# Patient Record
Sex: FEMALE | Race: WHITE | Employment: FULL TIME | URBAN - METROPOLITAN AREA
[De-identification: names, ages, dates, MRNs, and addresses within clinical notes are randomized per-mention and may not be internally consistent; named-entity substitution may affect disease eponyms.]

---

## 2022-03-14 ENCOUNTER — LAB ENCOUNTER (OUTPATIENT)
Dept: LAB | Age: 27
End: 2022-03-14
Attending: NURSE PRACTITIONER
Payer: COMMERCIAL

## 2022-03-14 DIAGNOSIS — N92.6 IRREGULAR MENSES: ICD-10-CM

## 2022-03-14 LAB
B-HCG SERPL-ACNC: ABNORMAL MIU/ML
HCT VFR BLD AUTO: 36.4 %
HGB BLD-MCNC: 12.3 G/DL
RH BLOOD TYPE: POSITIVE

## 2022-03-14 PROCEDURE — 85018 HEMOGLOBIN: CPT

## 2022-03-14 PROCEDURE — 84702 CHORIONIC GONADOTROPIN TEST: CPT

## 2022-03-14 PROCEDURE — 86901 BLOOD TYPING SEROLOGIC RH(D): CPT

## 2022-03-14 PROCEDURE — 36415 COLL VENOUS BLD VENIPUNCTURE: CPT

## 2022-03-14 PROCEDURE — 84144 ASSAY OF PROGESTERONE: CPT

## 2022-03-14 PROCEDURE — 85014 HEMATOCRIT: CPT

## 2022-03-14 PROCEDURE — 86900 BLOOD TYPING SEROLOGIC ABO: CPT

## 2022-03-15 PROBLEM — O46.8X1 SUBCHORIONIC HEMATOMA IN FIRST TRIMESTER: Status: ACTIVE | Noted: 2022-03-15

## 2022-03-15 PROBLEM — O41.8X10 SUBCHORIONIC HEMATOMA IN FIRST TRIMESTER: Status: ACTIVE | Noted: 2022-03-15

## 2022-04-05 PROCEDURE — 87086 URINE CULTURE/COLONY COUNT: CPT | Performed by: OBSTETRICS & GYNECOLOGY

## 2022-04-05 PROCEDURE — 87591 N.GONORRHOEAE DNA AMP PROB: CPT | Performed by: OBSTETRICS & GYNECOLOGY

## 2022-04-05 PROCEDURE — 88175 CYTOPATH C/V AUTO FLUID REDO: CPT | Performed by: OBSTETRICS & GYNECOLOGY

## 2022-04-05 PROCEDURE — 87491 CHLMYD TRACH DNA AMP PROBE: CPT | Performed by: OBSTETRICS & GYNECOLOGY

## 2022-05-10 PROBLEM — O98.512 COVID-19 AFFECTING PREGNANCY IN SECOND TRIMESTER: Status: ACTIVE | Noted: 2022-05-10

## 2022-05-10 PROBLEM — U07.1 COVID-19 AFFECTING PREGNANCY IN SECOND TRIMESTER: Status: ACTIVE | Noted: 2022-05-10

## 2022-08-01 ENCOUNTER — LAB ENCOUNTER (OUTPATIENT)
Dept: LAB | Age: 27
End: 2022-08-01
Attending: OBSTETRICS & GYNECOLOGY
Payer: COMMERCIAL

## 2022-08-01 DIAGNOSIS — Z34.02 ENCOUNTER FOR SUPERVISION OF NORMAL FIRST PREGNANCY IN SECOND TRIMESTER: ICD-10-CM

## 2022-08-01 DIAGNOSIS — Z34.03 ENCOUNTER FOR SUPERVISION OF NORMAL FIRST PREGNANCY IN THIRD TRIMESTER: ICD-10-CM

## 2022-08-01 LAB
BASOPHILS # BLD AUTO: 0.03 X10(3) UL (ref 0–0.2)
BASOPHILS NFR BLD AUTO: 0.4 %
DEPRECATED HBV CORE AB SER IA-ACNC: 11.4 NG/ML
EOSINOPHIL # BLD AUTO: 0.23 X10(3) UL (ref 0–0.7)
EOSINOPHIL NFR BLD AUTO: 2.8 %
ERYTHROCYTE [DISTWIDTH] IN BLOOD BY AUTOMATED COUNT: 12.9 %
GLUCOSE 1H P GLC SERPL-MCNC: 112 MG/DL
HCT VFR BLD AUTO: 31.6 %
HGB BLD-MCNC: 10.5 G/DL
IMM GRANULOCYTES # BLD AUTO: 0.06 X10(3) UL (ref 0–1)
IMM GRANULOCYTES NFR BLD: 0.7 %
LYMPHOCYTES # BLD AUTO: 1.93 X10(3) UL (ref 1–4)
LYMPHOCYTES NFR BLD AUTO: 23.1 %
MCH RBC QN AUTO: 30.1 PG (ref 26–34)
MCHC RBC AUTO-ENTMCNC: 33.2 G/DL (ref 31–37)
MCV RBC AUTO: 90.5 FL
MONOCYTES # BLD AUTO: 0.57 X10(3) UL (ref 0.1–1)
MONOCYTES NFR BLD AUTO: 6.8 %
NEUTROPHILS # BLD AUTO: 5.52 X10 (3) UL (ref 1.5–7.7)
NEUTROPHILS # BLD AUTO: 5.52 X10(3) UL (ref 1.5–7.7)
NEUTROPHILS NFR BLD AUTO: 66.2 %
PLATELET # BLD AUTO: 223 10(3)UL (ref 150–450)
RBC # BLD AUTO: 3.49 X10(6)UL
WBC # BLD AUTO: 8.3 X10(3) UL (ref 4–11)

## 2022-08-01 PROCEDURE — 82950 GLUCOSE TEST: CPT

## 2022-08-01 PROCEDURE — 87389 HIV-1 AG W/HIV-1&-2 AB AG IA: CPT

## 2022-08-01 PROCEDURE — 36415 COLL VENOUS BLD VENIPUNCTURE: CPT

## 2022-08-01 PROCEDURE — 82728 ASSAY OF FERRITIN: CPT

## 2022-08-01 PROCEDURE — 85025 COMPLETE CBC W/AUTO DIFF WBC: CPT

## 2022-11-04 NOTE — PROGRESS NOTES
Pt is a 33year old female admitted to [facility]. Patient presents with:  Laboring     Pt is  40w5d intra-uterine pregnancy. History obtained, consents signed. Oriented to room, staff, and plan of care.

## 2022-11-04 NOTE — ANESTHESIA PROCEDURE NOTES
Labor Analgesia    Date/Time: 11/4/2022 6:47 AM  Performed by: Cedrick Crisostomo MD  Authorized by: Cedrick Crisostomo MD       General Information and Staff    Start Time:  11/4/2022 6:47 AM  End Time:  11/4/2022 6:57 AM  Anesthesiologist:  Cedrick Crisostomo MD  Performed by:   Anesthesiologist  Patient Location:  OB  Site Identification: surface landmarks    Reason for Block: labor epidural    Preanesthetic Checklist: patient identified, IV checked, risks and benefits discussed, monitors and equipment checked, pre-op evaluation, timeout performed, IV bolus, anesthesia consent and sterile technique used      Procedure Details    Patient Position:  Sitting  Prep: ChloraPrep    Monitoring:  Heart rate and continuous pulse ox  Approach:  Midline    Epidural Needle    Injection Technique:  MATTIE saline  Needle Type:  Tuohy  Needle Gauge:  17 G  Needle Length:  3.375 in  Needle Insertion Depth:  6  Location:  L3-4    Spinal Needle      Catheter    Catheter Type:  End hole  Catheter Size:  19 G  Catheter at Skin Depth:  11  Test Dose:  Negative    Assessment    Sensory Level:  T10    Additional Comments     Test Dose Given at 657 am  Fentanyl 2 mc/ml + Ropivicaine 0.15% epidural infusion 12cc/hr  Fentanyl 50 mcg/mL 100 mcg  Bupiv 0.5% 5ml

## 2022-11-04 NOTE — PLAN OF CARE
Problem: BIRTH - VAGINAL/ SECTION  Goal: Fetal and maternal status remain reassuring during the birth process  Description: INTERVENTIONS:  - Monitor vital signs  - Monitor fetal heart rate  - Monitor uterine activity  - Monitor labor progression (vaginal delivery)  - DVT prophylaxis (C/S delivery)  - Surgical antibiotic prophylaxis (C/S delivery)  2022 by Dee Marcus RN  Outcome: Progressing  2022 by Dee Marcus RN  Outcome: Progressing     Problem: PAIN - ADULT  Goal: Verbalizes/displays adequate comfort level or patient's stated pain goal  Description: INTERVENTIONS:  - Encourage pt to monitor pain and request assistance  - Assess pain using appropriate pain scale  - Administer analgesics based on type and severity of pain and evaluate response  - Implement non-pharmacological measures as appropriate and evaluate response  - Consider cultural and social influences on pain and pain management  - Manage/alleviate anxiety  - Utilize distraction and/or relaxation techniques  - Monitor for opioid side effects  - Notify MD/LIP if interventions unsuccessful or patient reports new pain  - Anticipate increased pain with activity and pre-medicate as appropriate  2022 by Dee Marcus RN  Outcome: Progressing  2022 by Dee Marcus RN  Outcome: Progressing     Problem: ANXIETY  Goal: Will report anxiety at manageable levels  Description: INTERVENTIONS:  - Administer medication as ordered  - Teach and rehearse alternative coping skills  - Provide emotional support with 1:1 interaction with staff  2022 by Dee Marcus RN  Outcome: Progressing  2022 by Dee Marcus RN  Outcome: Progressing     Problem: Patient/Family Goals  Goal: Patient/Family Long Term Goal  Description: Patient's Long Term Goal: uncomplicated vaginal delivery    Interventions:  - proceed with POC  - See additional Care Plan goals for specific interventions  11/4/2022 0615 by Prasanna Valdes RN  Outcome: Progressing  11/4/2022 0615 by Prasanna Valdes RN  Outcome: Progressing  Goal: Patient/Family Short Term Goal  Description: Patient's Short Term Goal: adequate pain management    Interventions:   -proceed with POC  - See additional Care Plan goals for specific interventions  11/4/2022 0615 by Prasanna Valdes RN  Outcome: Progressing  11/4/2022 0615 by Prasanna Valdes RN  Outcome: Progressing

## 2022-11-04 NOTE — PLAN OF CARE
Problem: BIRTH - VAGINAL/ SECTION  Goal: Fetal and maternal status remain reassuring during the birth process  Description: INTERVENTIONS:  - Monitor vital signs  - Monitor fetal heart rate  - Monitor uterine activity  - Monitor labor progression (vaginal delivery)  - DVT prophylaxis (C/S delivery)  - Surgical antibiotic prophylaxis (C/S delivery)  Outcome: Progressing     Problem: PAIN - ADULT  Goal: Verbalizes/displays adequate comfort level or patient's stated pain goal  Description: INTERVENTIONS:  - Encourage pt to monitor pain and request assistance  - Assess pain using appropriate pain scale  - Administer analgesics based on type and severity of pain and evaluate response  - Implement non-pharmacological measures as appropriate and evaluate response  - Consider cultural and social influences on pain and pain management  - Manage/alleviate anxiety  - Utilize distraction and/or relaxation techniques  - Monitor for opioid side effects  - Notify MD/LIP if interventions unsuccessful or patient reports new pain  - Anticipate increased pain with activity and pre-medicate as appropriate  Outcome: Progressing     Problem: ANXIETY  Goal: Will report anxiety at manageable levels  Description: INTERVENTIONS:  - Administer medication as ordered  - Teach and rehearse alternative coping skills  - Provide emotional support with 1:1 interaction with staff  Outcome: Progressing     Problem: Patient/Family Goals  Goal: Patient/Family Long Term Goal  Description: Patient's Long Term Goal: uncomplicated vaginal delivery    Interventions:  - proceed with POC  - See additional Care Plan goals for specific interventions  Outcome: Progressing  Goal: Patient/Family Short Term Goal  Description: Patient's Short Term Goal: adequate pain management    Interventions:   -proceed with POC  - See additional Care Plan goals for specific interventions  Outcome: Progressing     Problem: SAFETY ADULT - FALL  Goal: Free from fall injury  Description: INTERVENTIONS:  - Assess pt frequently for physical needs  - Identify cognitive and physical deficits and behaviors that affect risk of falls.   - [place] fall precautions as indicated by assessment.  - Educate pt/family on patient safety including physical limitations  - Instruct pt to call for assistance with activity based on assessment  - Modify environment to reduce risk of injury  - Provide assistive devices as appropriate  - Consider OT/PT consult to assist with strengthening/mobility  - Encourage toileting schedule  Outcome: Progressing

## 2022-11-05 NOTE — PROGRESS NOTES
Patient transferred to mother/baby room 1116 per cart in stable condition with baby and personal belongings. Accompanied by  and staff. Report given to mother/baby GAY Hollis.

## 2022-11-05 NOTE — PLAN OF CARE
Problem: SAFETY ADULT - FALL  Goal: Free from fall injury  Description: INTERVENTIONS:  - Assess pt frequently for physical needs  - Identify cognitive and physical deficits and behaviors that affect risk of falls.   - [place] fall precautions as indicated by assessment.  - Educate pt/family on patient safety including physical limitations  - Instruct pt to call for assistance with activity based on assessment  - Modify environment to reduce risk of injury  - Provide assistive devices as appropriate  - Consider OT/PT consult to assist with strengthening/mobility  - Encourage toileting schedule  Outcome: Progressing

## 2022-11-05 NOTE — PROGRESS NOTES
Report given to HERB TURNER [facility]. Pt in stable condition.

## 2022-11-05 NOTE — PROGRESS NOTES
Patient struggling with an intermittently working Epidural on pump  Very uncomfortable     Cx:  7 cm with the head at 0 and caput at +2, cervix is now edematous as well. FTP for over 4 hours with adequate contractions. Plan PLTC/S  Risk and complications reviewed with patient, SO and Parents.

## 2022-11-05 NOTE — ANESTHESIA POSTPROCEDURE EVALUATION
[address] [facility] [place] Patient Status:  Inpatient   Age/Gender 33year old female MRN [id number]   Location [facility] Attending Berry Morse MD   Hosp Day # 0 PCP No primary care provider on file. Anesthesia Post-op Note     SECTION    Procedure Summary     Date: 22 Room / Location: [facility] L+D OR [facility] L+D OR    Anesthesia Start:  Anesthesia Stop:     Procedure:  SECTION Diagnosis:     Surgeons: Jammie Contreras MD Anesthesiologist: Evelia Borden MD    Anesthesia Type: general, epidural ASA Status: 2          Anesthesia Type: general, epidural    Vitals Value Taken Time   /71 22   Temp 101.5 22   Pulse 105 22   Resp 19 22   SpO2 100 % 22   Vitals shown include unvalidated device data. Patient Location: PACU    Anesthesia Type: general    Airway Patency: patent and extubated    Postop Pain Control: satisfactory to patient    Mental Status: preanesthetic baseline    Nausea/Vomiting: none    Cardiopulmonary/Hydration status: stable euvolemic    Complications: no apparent anesthesia related complications    Postop vital signs: stable    Dental Exam: Unchanged from Preop    Patient to be discharged from PACU when criteria met.

## 2022-11-05 NOTE — ANESTHESIA PROCEDURE NOTES
Airway  Date/Time: 11/4/2022 7:59 PM  Urgency: elective    Airway not difficult    General Information and Staff    Patient location during procedure: OR  Anesthesiologist: Tarsha Yost MD  Performed: anesthesiologist     Indications and Patient Condition  Indications for airway management: anesthesia  Spontaneous Ventilation: absent  Sedation level: deep  Preoxygenated: yes  Patient position: sniffing  Mask difficulty assessment: 0 - not attempted  Planned trial extubation    Final Airway Details  Final airway type: endotracheal airway      Successful airway: ETT  Cuffed: yes   Successful intubation technique: Video laryngoscopy  Facilitating devices/methods: cricoid pressure (Unable to pass ETT due to cricoid pressure- easily passed once cricoid eased.)  Endotracheal tube insertion site: oral  Blade: Sakshi  Blade size: #3  ETT size (mm): 7.0    Cormack-Lehane Classification: grade I - full view of glottis  Placement verified by: chest auscultation and capnometry   Cuff volume (mL): 10  Measured from: lips  ETT to lips (cm): 21  Number of attempts at approach: 1    Additional Comments  Atraumatic laryngoscopy and endotracheal intubation- no lip/teeth injury noted

## 2022-11-05 NOTE — CM/SW NOTE
Order received for sw to see pt due to EPDS score of 8. Sw met with pt and her life partner Adi Flores. Pt had  yesterday, delivered healthy baby girl. This is her only child. Discussed EPDS screening. Pt states she has hx of anxiety/depression. She sees a therapist and psychiatrist.  Provided her  Mood and Anxiety Disorders flyer. She appreciated social work visit.     Army Hernandez, MARIO  /Discharge Planner

## 2022-11-06 NOTE — PLAN OF CARE
Problem: SAFETY ADULT - FALL  Goal: Free from fall injury  Description: INTERVENTIONS:  - Assess pt frequently for physical needs  - Identify cognitive and physical deficits and behaviors that affect risk of falls. - [place] fall precautions as indicated by assessment.  - Educate pt/family on patient safety including physical limitations  - Instruct pt to call for assistance with activity based on assessment  - Modify environment to reduce risk of injury  - Provide assistive devices as appropriate  - Consider OT/PT consult to assist with strengthening/mobility  - Encourage toileting schedule  Outcome: Completed     Problem: POSTPARTUM  Goal: Long Term Goal:Experiences normal postpartum course  Description: INTERVENTIONS:  - Assess and monitor vital signs and lab values. - Assess fundus and lochia. - Provide ice/sitz baths for perineum discomfort. - Monitor healing of incision/episiotomy/laceration, and assess for signs and symptoms of infection and hematoma. - Assess bladder function and monitor for bladder distention.  - Provide/instruct/assist with pericare as needed. - Provide VTE prophylaxis as needed. - Monitor bowel function.  - Encourage ambulation and provide assistance as needed. - Assess and monitor emotional status and provide social service/psych resources as needed. - Utilize standard precautions and use personal protective equipment as indicated. Ensure aseptic care of all intravenous lines and invasive tubes/drains.  - Obtain immunization and exposure to communicable diseases history. Outcome: Progressing  Goal: Optimize infant feeding at the breast  Description: INTERVENTIONS:  - Initiate breast feeding within first hour after birth. - Monitor effectiveness of current breast feeding efforts. - Assess support systems available to mother/family.  - Identify cultural beliefs/practices regarding lactation, letdown techniques, maternal food preferences.   - Assess mother's knowledge and previous experience with breast feeding.  - Provide information as needed about early infant feeding cues (e.g., rooting, lip smacking, sucking fingers/hand) versus late cue of crying.  - Discuss/demonstrate breast feeding aids (e.g., infant sling, nursing footstool/pillows, and breast pumps). - Encourage mother/other family members to express feelings/concerns, and actively listen. - Educate father/SO about benefits of breast feeding and how to manage common lactation challenges. - Recommend avoidance of specific medications or substances incompatible with breast feeding.  - Assess and monitor for signs of nipple pain/trauma. - Instruct and provide assistance with proper latch. - Review techniques for milk expression (breast pumping) and storage of breast milk. Provide pumping equipment/supplies, instructions and assistance, as needed. - Encourage rooming-in and breast feeding on demand.  - Encourage skin-to-skin contact. - Provide LC support as needed. - Assess for and manage engorgement. - Provide breast feeding education handouts and information on community breast feeding support. Outcome: Progressing  Goal: Establishment of adequate milk supply with medication/procedure interruptions  Description: INTERVENTIONS:  - Review techniques for milk expression (breast pumping). - Provide pumping equipment/supplies, instructions, and assistance until it is safe to breastfeed infant. Outcome: Progressing  Goal: Experiences normal breast weaning course  Description: INTERVENTIONS:  - Assess for and manage engorgement. - Instruct on breast care. - Provide comfort measures. Outcome: Progressing  Goal: Appropriate maternal -  bonding  Description: INTERVENTIONS:  - Assess caregiver- interactions. - Assess caregiver's emotional status and coping mechanisms. - Encourage caregiver to participate in  daily care.   - Assess support systems available to mother/family.  - Provide social services/case management support as needed.   Outcome: Progressing

## 2022-11-07 NOTE — PROGRESS NOTES
DISCHARGE NOTE  Discharge & Follow-Up information reviewed with parents, no questions following. ID Bands checked and verified at bedside. HUGS and Kisses tags removed  Baby in: car seat  Pt. in wheelchair.    Escorted off unit by: PCT

## 2022-11-07 NOTE — DISCHARGE SUMMARY
[facility]  Discharge Summary    Kendra Stephen Patient Status:  Inpatient    1995 MRN [id number]   [facility] 2SW-J Attending Nikko Ceballos MD   Hosp Day # 3 PCP No primary care provider on file.      Admit Date:  2022    EDC: Estimated Date of Delivery: 10/30/22    Gestational Age: 39w6d    Antepartum complications: See Prenatal Record    Date of Delivery: See Delivery Summary    Delivered By:  See Delivery Summary    Delivery Type: primary  section, low transverse incision       Intrapartum Complications: See Delivery Summary    Episiotomy / lacerations: See Delivery Summary      Rh Immune Globulin Given:  See Prenatal Record and nursing notes    Rubella Vaccine Given: See Prenatal Record and nursing notes    Activity:  Routine post partum and post operative instructions if  section    Medications:  Motrin alternating with Tylenol, norco prn for sever pain    Diet:  General    Discharge Date: 2022          Plan:       Follow-up appointment in 2  weeks  Routine post partum instructions    Porsha Garrido MD  2022  9:58 AM

## 2022-11-10 NOTE — PROGRESS NOTES
Reviewed self and infant care w / mom, she verbalizes understanding of instructions reviewed. Encourage to follow up w/ MDs as directed and w/ questions/concernsEPDS = 8, denies ppd or bb but cradle call letter sent on my chart and enc to join ct.

## 2024-05-03 NOTE — PROGRESS NOTES
GYN H&P     Genetic questionnaire reviewed with the patient and she will be referred for genetic counseling if the questionnaire had any positive results.    The [facility] intake form was also reviewed regarding contraception, menstrual periods, urinary health, and vaginal / sexual health    5/3/2024  10:53 AM    Chief Complaint   Patient presents with    Physical     Patient is here for annual exam, and needs a refill on Beyaz       HPI: Kendra is a 28 year old  Patient's last menstrual period was 2024.  (contraception: OCPs) here for her annual gyn exam.     She has no complaints.   Menses are regular. Denies any pelvic or breast complaints.   Satisfied with current contraception.     Previous encounters and chart reviewed.     OB History    Para Term  AB Living   1 1 1     1   SAB IAB Ectopic Multiple Live Births         0 1      # Outcome Date GA Lbr Scotty/2nd Weight Sex Type Anes PTL Lv   1 Term 22 40w5d  7 lb 9.7 oz (3.45 kg) F Caesarean EPI, Gen N REBECCA      Complications: Meconium, Failure to Progress in First Stage, Temp 100.4 or greater       GYN hx:   Menarche: 14  Period Cycle (Days): 28  Period Duration (Days): 3  Use of Birth Control (if yes, specify type): OCP  Pap Date: 22  Pap Result Notes: neg/neg  Follow Up Recommendation:       History reviewed. No pertinent past medical history.  Past Surgical History:   Procedure Laterality Date      2022     Allergies   Allergen Reactions    Penicillins OTHER (SEE COMMENTS) and HIVES     other     Current Outpatient Medications on File Prior to Visit   Medication Sig Dispense Refill    FLUoxetine 20 MG Oral Cap Take 1 capsule (20 mg total) by mouth daily.      methylphenidate ER 36 MG Oral Tab CR Take 1 tablet (36 mg total) by mouth every morning.      Drospiren-Eth Estrad-Levomefol (BEYAZ) 3-0.02-0.451 MG Oral Tab Take 1 tablet by mouth daily. 84 tablet 3    Norethindrone (ORTHO MICRONOR)  0.35 MG Oral Tab Take 1 tablet (0.35 mg total) by mouth daily. (Patient not taking: Reported on 1/1/2023) 84 tablet 3     No current facility-administered medications on file prior to visit.     Family History   Problem Relation Age of Onset    Hypertension Father     No Known Problems Mother      Social History     Socioeconomic History    Marital status: Single     Spouse name: Not on file    Number of children: Not on file    Years of education: Not on file    Highest education level: Not on file   Occupational History    Not on file   Tobacco Use    Smoking status: Never    Smokeless tobacco: Never   Vaping Use    Vaping status: Never Used   Substance and Sexual Activity    Alcohol use: Yes     Alcohol/week: 3.0 standard drinks of alcohol     Types: 3 Glasses of wine per week    Drug use: Yes     Types: Cannabis     Comment: daily    Sexual activity: Yes     Partners: Male     Birth control/protection: OCP   Other Topics Concern    Not on file   Social History Narrative    Not on file     Social Determinants of Health     Financial Resource Strain: Not on file   Food Insecurity: Not on file   Transportation Needs: Not on file   Physical Activity: Not on file   Stress: Not on file   Social Connections: Not on file   Housing Stability: Not on file       ROS:     Review of Systems:  General: denies fevers, chills, fatigue and malaise.   Eyes: no visual changes, denies headaches  ENT: no complaints, denies earaches, runny nose, epistaxis, throat pain or sore throat  Respiratory: denies SOB, dyspnea, cough or wheezing  Cardiovascular: denies chest pain, palpitations, exercise intolerance   GI: denies abdominal pain, diarrhea, constipation  : no complaints, denies dysuria, increased urinary frequency. Menses regular, no dysmenorrhea, no menorrhagia, no dyspareunia   Hematological/lymphatic: denies history of excessive bleeding or bruising, denies dizziness, lightheadedness.   Breast: denies rashes, skin changes,  pain, lumps or discharge   Psychiatric: denies depression, changes in sleep patterns, anxiety  Endocrine: denies hot or cold intolerance, mood changes   Neurological: denies changes in sight, smell, hearing or taste. Denies seizures or tremors  Immunological: denies anaphylaxis, or swollen lymph nodes  Musculoskeletal: denies joint pain, morning stiffness, decreased range of motion         O /68   Ht 60\"   Wt 133 lb (60.3 kg)   LMP 04/29/2024   BMI 25.97 kg/m²         Wt Readings from Last 6 Encounters:   05/03/24 133 lb (60.3 kg)   04/20/23 135 lb (61.2 kg)   01/01/23 125 lb (56.7 kg)   12/13/22 126 lb (57.2 kg)   11/17/22 130 lb (59 kg)   11/04/22 157 lb (71.2 kg)     Exam:   GENERAL: well developed, well nourished, in no apparent distress, oriented.  SKIN: no rashes, no suspicious lesions  HEENT: normal  NECK: supple; no thyromegaly, no adenopathy  LUNGS: clear to auscultation  CARDIOVASCULAR: normal S1, S2, RRR  BREASTS:  nontender, no palpable masses or nodes, no nipple discharge, no skin changes, no axillary adenopathy  ABDOMEN:  soft, non distended; non tender, no masses  PELVIC: External Genitalia: Normal appearing, no lesions.    Vagina: normal pink mucosa, no lesions, blood in vault    Bladder well supported.  No  anterior or posterior hernias    Cervix:  no lesions , No CMT     Uterus: mobile, non tender, normal size    Adnexa: non tender, no masses, normal size    Rectal: deferred  EXTREMITIES:  non tender without edema        A/P: Patient is 28 year old female with no complaints. Here for well woman exam.            Patient counseled on:    Diet/exercise.      Self Breast Exams     Safe sex practices / and living environment     Vaccines:  Annual Flu, Tdap--2022               Pap: neg/neg - Year:  2022  GC/Chlamydia:  2023; done today  Lipid / Cholesterol:  per PCP         Meds This Visit:    Requested Prescriptions     Signed Prescriptions Disp Refills    Drospiren-Eth Estrad-Levomefol  (BEYAZ) 3-0.02-0.451 MG Oral Tab 84 tablet 3     Sig: Take 1 tablet by mouth daily.       1. Encounter for annual routine gynecological examination    2. Encounter for surveillance of contraceptive pills      No follow-ups on file.    Trell Garcia MD   5/3/2024  10:53 AM

## 2025-07-11 NOTE — PROGRESS NOTES
GYN H&P     Genetic questionnaire reviewed with the patient and she will be referred for genetic counseling if the questionnaire had any positive results.    The [facility] intake form was also reviewed regarding contraception, menstrual periods, urinary health, and vaginal / sexual health    The patient was offered a medical chaperone during the visit    2025  12:06 PM    Chief Complaint   Patient presents with    Physical     Pt reports she hasn't had a period in 59 days, negative pregnancy test       HPI: Kendra is a 30 year old  Patient's last menstrual period was 2025.  (contraception: none - open to prengancy) here for her annual gyn exam.     Reports that after breastfeeding her daughter, her periods were regular, Q28 days. She was on OCPs at that time for contraception but discontinued 2024 as she did not want to take them anymore/she wanted to see if her anxiety/depression symptoms improved. Ever since stopping the OCPs, her periods have become more irregular, coming Q30-40 days. She currently has not had a period in 59 days - has taken a pregnancy test at home and had hcg labwork through her PCP on Monday which was negative. Reports she was on OCPs starting around age 14/15 for management of acne so does not remember if periods are naturally irregular. Reports when they do occur, menses last 3 days in length with a moderate flow.    Denies any pelvic or breast complaints.  Not currently using any contraception, not actively trying for pregnancy but open to it if it occurs.    Previous encounters and chart reviewed.     OB History    Para Term  AB Living   1 1 1   1   SAB IAB Ectopic Multiple Live Births      0 1      # Outcome Date GA Lbr Scotty/2nd Weight Sex Type Anes PTL Lv   1 Term 23 40w5d  7 lb 9.7 oz (3.45 kg) F Caesarean EPI, Gen N REBECCA      Complications: Meconium, Failure to Progress in First Stage, Temp 100.4 or greater       GYN hx:    Menarche: 14  Period Cycle (Days): 59 days late  Period Duration (Days): 3  Use of Birth Control (if yes, specify type): Withdrawal  Pap Date: 04/05/22  Pap Result Notes: 4/5/22 neg/neg  Follow Up Recommendation: today      Past Medical History[1]  Past Surgical History[2]  Allergies[3]  Medications Ordered Prior to Encounter[4]  Family History[5]  Social History     Socioeconomic History    Marital status: Single     Spouse name: Not on file    Number of children: Not on file    Years of education: Not on file    Highest education level: Not on file   Occupational History    Not on file   Tobacco Use    Smoking status: Never    Smokeless tobacco: Never   Vaping Use    Vaping status: Never Used   Substance and Sexual Activity    Alcohol use: Yes     Alcohol/week: 3.0 standard drinks of alcohol     Types: 3 Glasses of wine per week    Drug use: Yes     Frequency: 7.0 times per week     Types: Cannabis     Comment: daily    Sexual activity: Yes     Partners: Male   Other Topics Concern    Not on file   Social History Narrative    Not on file     Social Drivers of Health     Food Insecurity: Not on file   Transportation Needs: Not on file   Stress: Not on file   Housing Stability: Not on file       ROS:     Review of Systems:  General: denies fevers, chills, fatigue and malaise.   Eyes: no visual changes, denies headaches  ENT: no complaints, denies earaches, runny nose, epistaxis, throat pain or sore throat  Respiratory: denies SOB, dyspnea, cough or wheezing  Cardiovascular: denies chest pain, palpitations, exercise intolerance   GI: denies abdominal pain, diarrhea, constipation  : no complaints, denies dysuria, increased urinary frequency. Menses irregular, no dysmenorrhea, no menorrhagia, no dyspareunia   Hematological/lymphatic: denies history of excessive bleeding or bruising, denies dizziness, lightheadedness.   Breast: denies rashes, skin changes, pain, lumps or discharge   Psychiatric: denies depression,  changes in sleep patterns, anxiety  Endocrine: denies hot or cold intolerance, mood changes   Neurological: denies changes in sight, smell, hearing or taste. Denies seizures or tremors  Immunological: denies allergies, denies anaphylaxis, or swollen lymph nodes  Musculoskeletal: denies joint pain, morning stiffness, decreased range of motion         O /74   Ht 60\"   Wt 139 lb (63 kg)   LMP 05/14/2025   BMI 27.15 kg/m²         Wt Readings from Last 6 Encounters:   07/11/25 139 lb (63 kg)   05/03/24 133 lb (60.3 kg)   04/20/23 135 lb (61.2 kg)   01/01/23 125 lb (56.7 kg)   12/13/22 126 lb (57.2 kg)   11/17/22 130 lb (59 kg)     Exam:   GENERAL: well developed, well nourished, in no apparent distress, oriented.  SKIN: no rashes, no suspicious lesions  HEENT: normal  NECK: supple; no thyromegaly, no adenopathy  LUNGS: clear to auscultation  CARDIOVASCULAR: normal S1, S2, RRR  BREASTS: soft, nontender, no palpable masses or nodes, no nipple discharge, no skin changes, no axillary adenopathy  ABDOMEN: low pfannenstiel c/s scars,  soft, non distended; non tender, no masses  PELVIC: External Genitalia: Normal appearing, no lesions.    Vagina: normal pink mucosa, no lesions, normal clear discharge.    Bladder well supported.  No  anterior or posterior hernias    Cervix: nulliparous, no lesions , No CMT     Uterus: AVAF, mobile, non tender, normal size    Adnexa: non tender, no masses, normal size    Rectal: deferred  EXTREMITIES:  non tender without edema         Patient counseled on:    Diet/exercise.      Self Breast Exams     Safe sex practices / and living environment     Vaccines:  Annual Flu          Pap: neg/neg - Year:  4/2022, collected today  GC/Chlamydia:  collected via pap  Mammogram:  n/a   Dexa:  n/a  Colonoscopy / Cologuard:   n/a  Lipid / Cholesterol:  ordered     A/P: Patient is 30 year old female with no complaints. Here for well woman exam.     Meds This Visit:    Requested Prescriptions      Signed Prescriptions Disp Refills    medroxyPROGESTERone Acetate 10 MG Oral Tab 30 tablet 2     Si PO q. day for 10 days if no menses x 6 weeks prn.       1. Encounter for well woman exam with routine gynecological exam    2. Screening for STDs (sexually transmitted diseases)  - Chlamydia/Gc Amplification; Future    3. Irregular menses  - Testosterone,Total and Weakly Bound w/ SHBG; Future  - Dehydroepiandrosterone Sulfate; Future  - LH (Luteinizing Hormone); Future  - FSH; Future  - Estradiol; Future  - Prolactin; Future  - TSH W Reflex To Free T4; Future  - HCG, Beta Subunit, Quant; Future  - medroxyPROGESTERone Acetate 10 MG Oral Tab; 1 PO q. day for 10 days if no menses x 6 weeks prn.  Dispense: 30 tablet; Refill: 2    4. Hx of  section    5. Screening for endocrine, metabolic and immunity disorder  - Hemoglobin A1C; Future  - Vitamin D, 25-Hydroxy; Future  - CBC With Differential With Platelet; Future  - Comp Metabolic Panel (14); Future  - Lipid Panel; Future    6. Encounter for screening for cervical cancer  - ThinPrep PAP Smear B; Future    Lab work ordered for irregular menses    Discussed risks of endometrial hyperplasia with irregular periods, offered OCPs vs provera to prevent endometrial thickening. Pt opts to try provera, discussed how to use, rx sent to pharmacy    Return in about 1 year (around 2026) for Well Woman Exam.    Elizabeth Jackson, APRN   2025  12:06 PM       This note was created by [employer] voice recognition. Errors in content may be related to improper recognition by the system; efforts to review and correct have been done but errors may still exist. Please contact me with any questions.    Note to patient and family   The 21st Century Cures Act makes medical notes available to patients in the interest of transparency.  However, please be advised that this is a medical document.  It is intended as [id number] communication.  It is written in medical language  which may contain abbreviations or verbiage that are technical and unfamiliar.  It may appear blunt or direct.  Medical documents are intended to carry relevant information, facts as evident, and the clinical opinion of the practitioner.           [1]   Past Medical History:   Amenorrhea    Anxiety    Depression   [2]   Past Surgical History:  Procedure Laterality Date      2022    Other surgical history  Tonsillectomy - approx.    [3]   Allergies  Allergen Reactions    Penicillins OTHER (SEE COMMENTS) and HIVES     other   [4]   Current Outpatient Medications on File Prior to Visit   Medication Sig Dispense Refill    FLUoxetine 20 MG Oral Cap Take 1 capsule (20 mg total) by mouth daily.      methylphenidate ER 36 MG Oral Tab CR Take 1 tablet (36 mg total) by mouth every morning.       No current facility-administered medications on file prior to visit.   [5]   Family History  Problem Relation Age of Onset    Hypertension Father     Endometriosis Mother